# Patient Record
Sex: FEMALE | Race: WHITE | NOT HISPANIC OR LATINO | URBAN - METROPOLITAN AREA
[De-identification: names, ages, dates, MRNs, and addresses within clinical notes are randomized per-mention and may not be internally consistent; named-entity substitution may affect disease eponyms.]

---

## 2017-06-12 ENCOUNTER — APPOINTMENT (RX ONLY)
Dept: URBAN - METROPOLITAN AREA SURGERY 2 | Facility: SURGERY | Age: 62
Setting detail: DERMATOLOGY
End: 2017-06-12

## 2017-06-12 DIAGNOSIS — Z48.89 ENCOUNTER FOR OTHER SPECIFIED SURGICAL AFTERCARE: ICD-10-CM

## 2017-06-12 PROCEDURE — ? STAPLE REMOVAL

## 2017-06-12 PROCEDURE — ? SUTURE REMOVAL

## 2017-06-12 PROCEDURE — ? POST-OP CHECK

## 2017-06-12 PROCEDURE — ? PATIENT SPECIFIC COUNSELING

## 2017-06-12 PROCEDURE — 99024 POSTOP FOLLOW-UP VISIT: CPT

## 2017-06-12 ASSESSMENT — LOCATION DETAILED DESCRIPTION DERM
LOCATION DETAILED: LEFT ANTERIOR PROXIMAL THIGH
LOCATION DETAILED: PERIUMBILICAL SKIN
LOCATION DETAILED: EPIGASTRIC SKIN

## 2017-06-12 ASSESSMENT — LOCATION SIMPLE DESCRIPTION DERM
LOCATION SIMPLE: ABDOMEN
LOCATION SIMPLE: LEFT THIGH

## 2017-06-12 ASSESSMENT — LOCATION ZONE DERM
LOCATION ZONE: TRUNK
LOCATION ZONE: LEG

## 2017-06-12 NOTE — PROCEDURE: PATIENT SPECIFIC COUNSELING
Detail Level: Simple
Other (Free Text): Discussed with patient that some of the skin on her thigh did not survive.\\nExplained to patient that because her surgical area is draining so much fluid, she should not expect to reach 30-50cc a day. Discussed with patient that once her drain is taken out, it is possible she will have to deal with fluid build up in her leg. \\nInformed patient that her drain will stay in for a couple more weeks.\\nRecommended to patient that she wrap her leg from the knee down to help with swelling. \\nDiscussed with patient that she needs to only clean the drain opening with hibiclens. \\nDiscussed with patient that as of now she does not need to use the wound vac, but will re-evaluate at her next follow-up.

## 2017-06-19 ENCOUNTER — RX ONLY (OUTPATIENT)
Age: 62
Setting detail: RX ONLY
End: 2017-06-19

## 2017-06-19 ENCOUNTER — APPOINTMENT (RX ONLY)
Dept: URBAN - METROPOLITAN AREA SURGERY 2 | Facility: SURGERY | Age: 62
Setting detail: DERMATOLOGY
End: 2017-06-19

## 2017-06-19 DIAGNOSIS — Z48.89 ENCOUNTER FOR OTHER SPECIFIED SURGICAL AFTERCARE: ICD-10-CM

## 2017-06-19 PROCEDURE — 99024 POSTOP FOLLOW-UP VISIT: CPT

## 2017-06-19 PROCEDURE — ? COUNSELING - POST-OP CHECK, COMPLEX WOUND RECONSTRUCTION

## 2017-06-19 RX ORDER — FUROSEMIDE 20 MG/1
TABLET ORAL QAM
Qty: 7 | Refills: 0 | Status: ERX | COMMUNITY
Start: 2017-06-19

## 2017-06-19 ASSESSMENT — LOCATION ZONE DERM
LOCATION ZONE: LEG
LOCATION ZONE: TRUNK

## 2017-06-19 ASSESSMENT — LOCATION DETAILED DESCRIPTION DERM
LOCATION DETAILED: LEFT ANTERIOR PROXIMAL THIGH
LOCATION DETAILED: LEFT LATERAL ABDOMEN

## 2017-06-19 ASSESSMENT — LOCATION SIMPLE DESCRIPTION DERM
LOCATION SIMPLE: ABDOMEN
LOCATION SIMPLE: LEFT THIGH

## 2017-06-27 ENCOUNTER — APPOINTMENT (RX ONLY)
Dept: URBAN - METROPOLITAN AREA SURGERY 2 | Facility: SURGERY | Age: 62
Setting detail: DERMATOLOGY
End: 2017-06-27

## 2017-06-27 DIAGNOSIS — Z48.89 ENCOUNTER FOR OTHER SPECIFIED SURGICAL AFTERCARE: ICD-10-CM

## 2017-06-27 PROCEDURE — ? SUTURE REMOVAL

## 2017-06-27 PROCEDURE — ? PATIENT SPECIFIC COUNSELING

## 2017-06-27 PROCEDURE — ? PRESCRIPTION

## 2017-06-27 PROCEDURE — 99024 POSTOP FOLLOW-UP VISIT: CPT

## 2017-06-27 RX ORDER — COLLAGENASE SANTYL 250 [ARB'U]/G
OINTMENT TOPICAL BID
Qty: 1 | Refills: 3 | Status: ERX | COMMUNITY
Start: 2017-06-27

## 2017-06-27 RX ADMIN — COLLAGENASE SANTYL: 250 OINTMENT TOPICAL at 00:00

## 2017-06-27 ASSESSMENT — LOCATION ZONE DERM: LOCATION ZONE: LEG

## 2017-06-27 ASSESSMENT — LOCATION SIMPLE DESCRIPTION DERM: LOCATION SIMPLE: LEFT THIGH

## 2017-06-27 ASSESSMENT — LOCATION DETAILED DESCRIPTION DERM: LOCATION DETAILED: LEFT ANTERIOR PROXIMAL THIGH

## 2017-06-27 NOTE — PROCEDURE: PATIENT SPECIFIC COUNSELING
Detail Level: Simple
Other (Free Text): Patient to begin to lay in back and continue to take care with walking. Patient to continue applying hibiclens on drain entrance. \\nPatient to begin to use Santyl twice a day. \\nDiscussed with patient that we will remove the drain later by July 12. Patients states she is draining 250-300cc daily.

## 2017-07-07 ENCOUNTER — APPOINTMENT (RX ONLY)
Dept: URBAN - METROPOLITAN AREA SURGERY 2 | Facility: SURGERY | Age: 62
Setting detail: DERMATOLOGY
End: 2017-07-07

## 2017-07-07 DIAGNOSIS — Z48.89 ENCOUNTER FOR OTHER SPECIFIED SURGICAL AFTERCARE: ICD-10-CM

## 2017-07-07 PROCEDURE — 99024 POSTOP FOLLOW-UP VISIT: CPT

## 2017-07-07 PROCEDURE — ? PATIENT SPECIFIC COUNSELING

## 2017-07-07 PROCEDURE — ? SUTURE REMOVAL

## 2017-07-07 PROCEDURE — ? PRESCRIPTION

## 2017-07-07 RX ORDER — COLLAGENASE SANTYL 250 [ARB'U]/G
OINTMENT TOPICAL BID
Qty: 1 | Refills: 3 | Status: ACTIVE

## 2017-07-07 ASSESSMENT — LOCATION DETAILED DESCRIPTION DERM: LOCATION DETAILED: LEFT ANTERIOR PROXIMAL THIGH

## 2017-07-07 ASSESSMENT — LOCATION ZONE DERM: LOCATION ZONE: LEG

## 2017-07-07 ASSESSMENT — LOCATION SIMPLE DESCRIPTION DERM: LOCATION SIMPLE: LEFT THIGH

## 2017-07-07 NOTE — PROCEDURE: PATIENT SPECIFIC COUNSELING
Other (Free Text): Patient to begin to lay in back and continue to take care with walking. Patient to continue applying hibiclens on drain entrance. \\nPatient to begin to use Santyl twice a day. \\nDiscussed with patient that we will remove the drain later by July 12. Patients states she is draining 250-300cc daily.\\n\\nDr Stu is referring pt to a wound care in [place]\\nAlso he is pleased with the healing and he is removing sutures today \\nMinimum clear drain image but okay \\nApplied dressing today \\nWeekly visits \\nDr Stu is unsure about chemotherapy at the moment he will discuss at f/u
Detail Level: Simple

## 2017-07-14 ENCOUNTER — APPOINTMENT (RX ONLY)
Dept: URBAN - METROPOLITAN AREA SURGERY 2 | Facility: SURGERY | Age: 62
Setting detail: DERMATOLOGY
End: 2017-07-14

## 2017-07-14 DIAGNOSIS — Z48.89 ENCOUNTER FOR OTHER SPECIFIED SURGICAL AFTERCARE: ICD-10-CM

## 2017-07-14 PROCEDURE — ? DRAIN REMOVAL

## 2017-07-14 PROCEDURE — 99024 POSTOP FOLLOW-UP VISIT: CPT

## 2017-07-14 PROCEDURE — ? PRESCRIPTION

## 2017-07-14 PROCEDURE — ? PATIENT SPECIFIC COUNSELING

## 2017-07-14 PROCEDURE — ? SUTURE REMOVAL

## 2017-07-14 RX ORDER — COLLAGENASE SANTYL 250 [ARB'U]/G
OINTMENT TOPICAL BID
Qty: 1 | Refills: 3 | Status: ACTIVE

## 2017-07-14 ASSESSMENT — LOCATION SIMPLE DESCRIPTION DERM: LOCATION SIMPLE: LEFT THIGH

## 2017-07-14 ASSESSMENT — LOCATION ZONE DERM: LOCATION ZONE: LEG

## 2017-07-14 ASSESSMENT — LOCATION DETAILED DESCRIPTION DERM: LOCATION DETAILED: LEFT ANTERIOR PROXIMAL THIGH

## 2017-07-14 NOTE — PROCEDURE: PATIENT SPECIFIC COUNSELING
Detail Level: Simple
Other (Free Text): Patient to begin to lay in back and continue to take care with walking. Patient to continue applying hibiclens on drain entrance. \\nPatient to begin to use Santyl twice a day. \\nPatients states she is draining 250-300cc daily. Advised after drain removal, fluid will build up and may cause swelling and pain. \\n\\nDr Stu is referring pt to a wound care in [place]\\nAlso he is pleased with the healing and he is removing sutures today \\nMinimum clear drain image but okay \\nApplied dressing today \\nWeekly visits \\nDr Stu is unsure about chemotherapy at the moment he will discuss at f/u\\nDr. Stu discussed seeing a neurologist for lack of feeling in her foot.

## 2017-07-21 ENCOUNTER — APPOINTMENT (RX ONLY)
Dept: URBAN - METROPOLITAN AREA SURGERY 2 | Facility: SURGERY | Age: 62
Setting detail: DERMATOLOGY
End: 2017-07-21

## 2017-07-21 DIAGNOSIS — Z48.89 ENCOUNTER FOR OTHER SPECIFIED SURGICAL AFTERCARE: ICD-10-CM

## 2017-07-21 PROBLEM — I89.0 LYMPHEDEMA, NOT ELSEWHERE CLASSIFIED: Status: ACTIVE | Noted: 2017-07-21

## 2017-07-21 PROCEDURE — ? PATIENT SPECIFIC COUNSELING

## 2017-07-21 PROCEDURE — 99213 OFFICE O/P EST LOW 20 MIN: CPT

## 2017-07-21 ASSESSMENT — LOCATION DETAILED DESCRIPTION DERM: LOCATION DETAILED: LEFT ANTERIOR PROXIMAL THIGH

## 2017-07-21 ASSESSMENT — LOCATION ZONE DERM: LOCATION ZONE: LEG

## 2017-07-21 ASSESSMENT — LOCATION SIMPLE DESCRIPTION DERM: LOCATION SIMPLE: LEFT THIGH

## 2017-07-21 NOTE — PROCEDURE: PATIENT SPECIFIC COUNSELING
Detail Level: Generalized
Other (Free Text): Dr. Peraza is pleased with the look of the wound. Patient is moving forward with healing. Expects for wound to keep getting better and better. Wound was cleansed today and then packed with Anny. Covered with Telfa pad and tape.
Other (Free Text): Advised that due to swelling present in right leg patient needs to get in to have ultrasound today to rule out a clot. Advised to call Dr. Hernández today to get in.

## 2017-08-07 ENCOUNTER — APPOINTMENT (RX ONLY)
Dept: URBAN - METROPOLITAN AREA SURGERY 2 | Facility: SURGERY | Age: 62
Setting detail: DERMATOLOGY
End: 2017-08-07

## 2017-08-07 DIAGNOSIS — Z48.89 ENCOUNTER FOR OTHER SPECIFIED SURGICAL AFTERCARE: ICD-10-CM

## 2017-08-07 PROCEDURE — ? PATIENT SPECIFIC COUNSELING

## 2017-08-07 PROCEDURE — 99213 OFFICE O/P EST LOW 20 MIN: CPT

## 2017-08-07 ASSESSMENT — LOCATION DETAILED DESCRIPTION DERM: LOCATION DETAILED: LEFT ANTERIOR PROXIMAL THIGH

## 2017-08-07 ASSESSMENT — LOCATION SIMPLE DESCRIPTION DERM: LOCATION SIMPLE: LEFT THIGH

## 2017-08-07 ASSESSMENT — LOCATION ZONE DERM: LOCATION ZONE: LEG

## 2017-08-07 NOTE — PROCEDURE: PATIENT SPECIFIC COUNSELING
Detail Level: Generalized
Other (Free Text): I'm very pleased with the look of the wound. I did a little debridement to get the dead tissue out. I informed  patient that I was happy to see pink health tissue. I advised pt to continue with using tamra packing  and with seeing the wound care specialist. I wrote a prescription for level 2 panty hoes. For her left leg. I would like to see patient in 1 mo.

## 2017-09-25 ENCOUNTER — APPOINTMENT (RX ONLY)
Dept: URBAN - METROPOLITAN AREA SURGERY 2 | Facility: SURGERY | Age: 62
Setting detail: DERMATOLOGY
End: 2017-09-25

## 2017-09-25 DIAGNOSIS — Z48.89 ENCOUNTER FOR OTHER SPECIFIED SURGICAL AFTERCARE: ICD-10-CM

## 2017-09-25 PROCEDURE — ? COUNSELING - POST-OP CHECK, COMPLEX WOUND RECONSTRUCTION

## 2017-09-25 PROCEDURE — ? PATIENT SPECIFIC COUNSELING

## 2017-09-25 PROCEDURE — 99213 OFFICE O/P EST LOW 20 MIN: CPT

## 2017-09-25 PROCEDURE — ? POST-OP CHECK

## 2017-09-25 ASSESSMENT — LOCATION ZONE DERM: LOCATION ZONE: LEG

## 2017-09-25 ASSESSMENT — LOCATION DETAILED DESCRIPTION DERM: LOCATION DETAILED: LEFT ANTERIOR PROXIMAL THIGH

## 2017-09-25 ASSESSMENT — LOCATION SIMPLE DESCRIPTION DERM: LOCATION SIMPLE: LEFT THIGH

## 2017-09-25 NOTE — PROCEDURE: PATIENT SPECIFIC COUNSELING
Other (Free Text): Patient states that 2 clots were found in her left leg. She also stated that an Angioplasty was performed and 2 stents were placed in her left leg.\\Bautista today's visit packing was removed, debridement was done and open areas were repacked with xeroform.\\nExplained to patient that her leg is healing well overall.\\nDiscussed with patient that she should continue physical therapy, because it will help her gain some strength back back in her left leg.\\Naina discussed with patient that she can use silicone gel sheets on abdomen to help improve her scars.\\nPatient will return to clinic in 3 months, and at that time another order will be written for her to continue physical therapy.
Detail Level: Simple

## 2018-01-12 ENCOUNTER — APPOINTMENT (RX ONLY)
Dept: URBAN - METROPOLITAN AREA CLINIC 12 | Facility: CLINIC | Age: 63
Setting detail: DERMATOLOGY
End: 2018-01-12

## 2018-01-12 DIAGNOSIS — Z48.89 ENCOUNTER FOR OTHER SPECIFIED SURGICAL AFTERCARE: ICD-10-CM

## 2018-01-12 PROCEDURE — ? PATIENT SPECIFIC COUNSELING

## 2018-01-12 PROCEDURE — 99212 OFFICE O/P EST SF 10 MIN: CPT

## 2018-01-12 PROCEDURE — ? POST-OP CHECK

## 2018-01-12 ASSESSMENT — LOCATION ZONE DERM: LOCATION ZONE: LEG

## 2018-01-12 ASSESSMENT — LOCATION SIMPLE DESCRIPTION DERM: LOCATION SIMPLE: LEFT THIGH

## 2018-01-12 ASSESSMENT — LOCATION DETAILED DESCRIPTION DERM: LOCATION DETAILED: LEFT ANTERIOR PROXIMAL THIGH

## 2018-01-12 NOTE — PROCEDURE: PATIENT SPECIFIC COUNSELING
Other (Free Text): Patient states she had another blood clot in her left leg and is currently on xorelto. Dr. Peraza cleaned her wounds and educated patient on trying medi honey. Dr. Peraza ensured patient that her wound looks good and she’s moving along great. Dr. Peraza informed patient to return in a couple months, and encouraged her to call sooner should she need too.
Detail Level: Zone

## 2018-01-12 NOTE — PROCEDURE: POST-OP CHECK
Add Postop Global No-Charge Code (22274)?: no
Wound Evaluated By: Dr. Peraza.
Detail Level: Detailed

## 2018-04-16 ENCOUNTER — APPOINTMENT (RX ONLY)
Dept: URBAN - METROPOLITAN AREA CLINIC 12 | Facility: CLINIC | Age: 63
Setting detail: DERMATOLOGY
End: 2018-04-16

## 2018-04-16 DIAGNOSIS — Z48.89 ENCOUNTER FOR OTHER SPECIFIED SURGICAL AFTERCARE: ICD-10-CM

## 2018-04-16 PROCEDURE — 99213 OFFICE O/P EST LOW 20 MIN: CPT

## 2018-04-16 PROCEDURE — ? PATIENT SPECIFIC COUNSELING

## 2018-04-16 PROCEDURE — ? POST-OP CHECK

## 2018-04-16 ASSESSMENT — LOCATION ZONE DERM: LOCATION ZONE: LEG

## 2018-04-16 ASSESSMENT — LOCATION DETAILED DESCRIPTION DERM: LOCATION DETAILED: LEFT ANTERIOR PROXIMAL THIGH

## 2018-04-16 ASSESSMENT — LOCATION SIMPLE DESCRIPTION DERM: LOCATION SIMPLE: LEFT THIGH

## 2018-04-16 NOTE — PROCEDURE: POST-OP CHECK
Wound Evaluated By: Dr. Peraza.
Detail Level: Detailed
Add Postop Global No-Charge Code (60811)?: no

## 2018-04-16 NOTE — PROCEDURE: PATIENT SPECIFIC COUNSELING
Detail Level: Zone
Other (Free Text): Patient reports she’s doing well, no concerns , no fevers , coughs, or colds. Patient here to follow up on her left upper leg. Patient states she’s continuing wound care. Dr. Peraza removed her packing and repacked her wound. Patient mentioned doing the hyperbaric chamber treatment, Dr. Peraza mentioned that if her insurance pays for it, then he has no objection with the treatment, however if insurance will not cover it, then not to worry herself with those treatments as they are very expensive.  Dr. Peraza advised patient to check with Dr. Burris to make sure the hyperbarics treatment will not interfere with his treatment. \\n\\nPatient advised to return in 4 months.

## 2018-10-30 ENCOUNTER — APPOINTMENT (RX ONLY)
Dept: URBAN - METROPOLITAN AREA CLINIC 12 | Facility: CLINIC | Age: 63
Setting detail: DERMATOLOGY
End: 2018-10-30

## 2018-10-30 DIAGNOSIS — Z48.89 ENCOUNTER FOR OTHER SPECIFIED SURGICAL AFTERCARE: ICD-10-CM

## 2018-10-30 PROCEDURE — ? POST-OP CHECK

## 2018-10-30 PROCEDURE — 99213 OFFICE O/P EST LOW 20 MIN: CPT

## 2018-10-30 PROCEDURE — ? PATIENT SPECIFIC COUNSELING

## 2018-10-30 ASSESSMENT — LOCATION ZONE DERM: LOCATION ZONE: LEG

## 2018-10-30 ASSESSMENT — LOCATION SIMPLE DESCRIPTION DERM: LOCATION SIMPLE: LEFT THIGH

## 2018-10-30 ASSESSMENT — LOCATION DETAILED DESCRIPTION DERM: LOCATION DETAILED: LEFT ANTERIOR PROXIMAL THIGH

## 2018-10-30 NOTE — PROCEDURE: POST-OP CHECK
Detail Level: Detailed
Wound Evaluated By: Dr. Peraza.
Add Postop Global No-Charge Code (00501)?: no

## 2018-10-30 NOTE — PROCEDURE: PATIENT SPECIFIC COUNSELING
Detail Level: Zone
Other (Free Text): Patient presents for post op check wound reconstruction. Patient states she’s doing well m and getting around well. Patient states that she’s continuing care with the wound specialist. Dr. Peraza states that the small hole is healing well. Dr. Peraza advised patient to continue her regular follow ups with her primary care physician. Patient was graduated today.